# Patient Record
Sex: FEMALE | Employment: FULL TIME | URBAN - METROPOLITAN AREA
[De-identification: names, ages, dates, MRNs, and addresses within clinical notes are randomized per-mention and may not be internally consistent; named-entity substitution may affect disease eponyms.]

---

## 2020-01-20 ENCOUNTER — TELEPHONE (OUTPATIENT)
Dept: ENDOCRINOLOGY | Facility: HOSPITAL | Age: 55
End: 2020-01-20

## 2020-06-01 ENCOUNTER — TELEPHONE (OUTPATIENT)
Dept: ENDOCRINOLOGY | Facility: HOSPITAL | Age: 55
End: 2020-06-01

## 2020-06-03 ENCOUNTER — CONSULT (OUTPATIENT)
Dept: ENDOCRINOLOGY | Facility: HOSPITAL | Age: 55
End: 2020-06-03
Payer: COMMERCIAL

## 2020-06-03 VITALS
HEART RATE: 60 BPM | WEIGHT: 93.6 LBS | TEMPERATURE: 97.7 F | HEIGHT: 64 IN | BODY MASS INDEX: 15.98 KG/M2 | SYSTOLIC BLOOD PRESSURE: 104 MMHG | DIASTOLIC BLOOD PRESSURE: 64 MMHG

## 2020-06-03 DIAGNOSIS — E05.90 HYPERTHYROIDISM: Primary | ICD-10-CM

## 2020-06-03 PROCEDURE — 99244 OFF/OP CNSLTJ NEW/EST MOD 40: CPT | Performed by: INTERNAL MEDICINE

## 2020-06-03 RX ORDER — LANOLIN ALCOHOL/MO/W.PET/CERES
3000 CREAM (GRAM) TOPICAL DAILY
COMMUNITY

## 2020-06-03 RX ORDER — MELATONIN
5000 DAILY
COMMUNITY

## 2020-06-03 RX ORDER — ASCORBIC ACID 1000 MG
TABLET ORAL
COMMUNITY

## 2020-06-13 LAB
T3FREE SERPL-MCNC: 2.4 PG/ML (ref 2–4.4)
T4 FREE SERPL-MCNC: 0.72 NG/DL (ref 0.82–1.77)
THYROGLOB AB SERPL-ACNC: <1 IU/ML (ref 0–0.9)
THYROPEROXIDASE AB SERPL-ACNC: <9 IU/ML (ref 0–34)
TSH SERPL DL<=0.005 MIU/L-ACNC: 2.66 UIU/ML (ref 0.45–4.5)
TSI SER-ACNC: <0.1 IU/L (ref 0–0.55)

## 2020-06-16 DIAGNOSIS — E05.90 HYPERTHYROIDISM: Primary | ICD-10-CM

## 2020-09-23 LAB
T3FREE SERPL-MCNC: 2.6 PG/ML (ref 2–4.4)
T4 FREE SERPL DIALY-MCNC: 0.6 NG/DL
T4 FREE SERPL-MCNC: 0.65 NG/DL (ref 0.82–1.77)
TSH SERPL DL<=0.005 MIU/L-ACNC: 3.15 UIU/ML (ref 0.45–4.5)

## 2020-09-24 DIAGNOSIS — E05.90 HYPERTHYROIDISM: Primary | ICD-10-CM

## 2020-12-05 LAB
T3FREE SERPL-MCNC: 2.4 PG/ML (ref 2–4.4)
T4 FREE SERPL-MCNC: 0.67 NG/DL (ref 0.82–1.77)
TSH SERPL DL<=0.005 MIU/L-ACNC: 3.03 UIU/ML (ref 0.45–4.5)
TSI SER-ACNC: <0.1 IU/L (ref 0–0.55)

## 2020-12-16 ENCOUNTER — OFFICE VISIT (OUTPATIENT)
Dept: ENDOCRINOLOGY | Facility: HOSPITAL | Age: 55
End: 2020-12-16
Payer: COMMERCIAL

## 2020-12-16 VITALS
BODY MASS INDEX: 15.64 KG/M2 | SYSTOLIC BLOOD PRESSURE: 86 MMHG | WEIGHT: 91.6 LBS | HEIGHT: 64 IN | DIASTOLIC BLOOD PRESSURE: 50 MMHG | HEART RATE: 72 BPM

## 2020-12-16 DIAGNOSIS — E05.90 HYPERTHYROIDISM: Primary | ICD-10-CM

## 2020-12-16 PROCEDURE — 99213 OFFICE O/P EST LOW 20 MIN: CPT | Performed by: INTERNAL MEDICINE

## 2020-12-16 RX ORDER — RIBOFLAVIN (VITAMIN B2) 100 MG
100 TABLET ORAL DAILY
COMMUNITY

## 2020-12-28 ENCOUNTER — TELEPHONE (OUTPATIENT)
Dept: ENDOCRINOLOGY | Facility: HOSPITAL | Age: 55
End: 2020-12-28

## 2021-03-25 LAB
T4 FREE SERPL DIALY-MCNC: 0.51 NG/DL
T4 FREE SERPL-MCNC: 0.7 NG/DL (ref 0.82–1.77)
TSH SERPL DL<=0.005 MIU/L-ACNC: 2.45 UIU/ML (ref 0.45–4.5)

## 2021-03-26 DIAGNOSIS — E03.8 CENTRAL HYPOTHYROIDISM: Primary | ICD-10-CM

## 2021-03-29 ENCOUNTER — TELEPHONE (OUTPATIENT)
Dept: ENDOCRINOLOGY | Facility: HOSPITAL | Age: 56
End: 2021-03-29

## 2021-03-29 NOTE — TELEPHONE ENCOUNTER
Patient l/m today stating that she was calling you back  I don't see anything noted for today  I see you spoke to her Friday, but she left the message today

## 2021-07-24 LAB
ALBUMIN SERPL-MCNC: 4.4 G/DL (ref 3.8–4.9)
ALBUMIN/GLOB SERPL: 1.8 {RATIO} (ref 1.2–2.2)
ALP SERPL-CCNC: 88 IU/L (ref 48–121)
ALT SERPL-CCNC: 23 IU/L (ref 0–32)
AST SERPL-CCNC: 30 IU/L (ref 0–40)
BILIRUB SERPL-MCNC: 0.3 MG/DL (ref 0–1.2)
BUN SERPL-MCNC: 13 MG/DL (ref 6–24)
BUN/CREAT SERPL: 21 (ref 9–23)
CALCIUM SERPL-MCNC: 9 MG/DL (ref 8.7–10.2)
CHLORIDE SERPL-SCNC: 99 MMOL/L (ref 96–106)
CO2 SERPL-SCNC: 27 MMOL/L (ref 20–29)
CREAT SERPL-MCNC: 0.63 MG/DL (ref 0.57–1)
FSH SERPL-ACNC: 73.4 MIU/ML
GLOBULIN SER-MCNC: 2.4 G/DL (ref 1.5–4.5)
GLUCOSE SERPL-MCNC: 73 MG/DL (ref 65–99)
IGF-I SERPL-MCNC: 123 NG/ML (ref 60–207)
LH SERPL-ACNC: 25.6 MIU/ML
POTASSIUM SERPL-SCNC: 4.7 MMOL/L (ref 3.5–5.2)
PROLACTIN SERPL-MCNC: 4.9 NG/ML (ref 4.8–23.3)
PROT SERPL-MCNC: 6.8 G/DL (ref 6–8.5)
SL AMB EGFR AFRICAN AMERICAN: 115 ML/MIN/1.73
SL AMB EGFR NON AFRICAN AMERICAN: 100 ML/MIN/1.73
SODIUM SERPL-SCNC: 137 MMOL/L (ref 134–144)
T3FREE SERPL-MCNC: 2.4 PG/ML (ref 2–4.4)
T4 FREE SERPL-MCNC: 0.68 NG/DL (ref 0.82–1.77)
TSH SERPL DL<=0.005 MIU/L-ACNC: 2.21 UIU/ML (ref 0.45–4.5)

## 2021-07-28 LAB
ACTH PLAS-MCNC: 17.5 PG/ML (ref 7.2–63.3)
CORTIS AM PEAK SERPL-MCNC: 11 UG/DL (ref 6.2–19.4)

## 2021-08-19 ENCOUNTER — OFFICE VISIT (OUTPATIENT)
Dept: ENDOCRINOLOGY | Facility: HOSPITAL | Age: 56
End: 2021-08-19
Payer: COMMERCIAL

## 2021-08-19 VITALS
SYSTOLIC BLOOD PRESSURE: 108 MMHG | HEIGHT: 64 IN | DIASTOLIC BLOOD PRESSURE: 70 MMHG | BODY MASS INDEX: 15.54 KG/M2 | HEART RATE: 56 BPM | WEIGHT: 91 LBS

## 2021-08-19 DIAGNOSIS — R94.6 ABNORMAL THYROID FUNCTION TEST: Primary | ICD-10-CM

## 2021-08-19 DIAGNOSIS — E03.8 CENTRAL HYPOTHYROIDISM: ICD-10-CM

## 2021-08-19 PROCEDURE — 99213 OFFICE O/P EST LOW 20 MIN: CPT | Performed by: INTERNAL MEDICINE

## 2021-08-19 NOTE — PROGRESS NOTES
8/19/2021    Assessment/Plan      Diagnoses and all orders for this visit:    Abnormal thyroid function test  -     TSH, 3rd generation; Future  -     T3, free; Future  -     T4, free; Future  -     T4, Free, Direct Dialysis and T4, Total; Future  -     Thyroxine binding globulin; Future  -     Comprehensive metabolic panel Lab Collect; Future  -     T4- Lab Collect; Future    Central hypothyroidism  -     TSH, 3rd generation; Future  -     T3, free; Future  -     T4, free; Future  -     T4, Free, Direct Dialysis and T4, Total; Future  -     Thyroxine binding globulin; Future  -     Comprehensive metabolic panel Lab Collect; Future  -     T4- Lab Collect; Future    Other orders  -     patient supplied medication; Cordyceps        Assessment/Plan:    57-year-old female presents for a follow-up of abnormal thyroid function studies  I suspect this may have been a silent thyroiditis that is slowly recovering  If the free T4 remains low we may have to consider central hypothyroidism as a cause or other interference from other medication or supplement though she does states she hold her biotin prior to getting lab work done  I did ask her to go to a different lab if the lab by insurance to see if lab work will return normal in a different lab and there is lab artifact at [employer]  We will repeat labs in 2-3 months and we will call her with the results  CC: Follow-up    History of Present Illness     HPI: Lia Zimmerman is a 60y o  year old female   Who presents for a follow-up of abnormal thyroid function studies  She does have a family history of thyroid cancer  Initially she was evaluated for biochemical thyrotoxicosis  Blood work showed levels trended the opposite way and free T4 was slightly low  We ended up monitoring over time in blood work as improved  She presents today overall feeling well  No new health issues or symptoms of concern  Review of Systems   Constitutional: Negative for fatigue  HENT: Negative for trouble swallowing and voice change  Eyes: Negative for visual disturbance  Respiratory: Negative for shortness of breath  Cardiovascular: Negative for palpitations and leg swelling  Gastrointestinal: Negative for abdominal pain, nausea and vomiting  Endocrine: Negative for polydipsia and polyuria  Musculoskeletal: Negative for arthralgias and myalgias  Skin: Negative for rash  Neurological: Negative for dizziness, tremors and weakness  Hematological: Negative for adenopathy  Psychiatric/Behavioral: Negative for agitation and confusion  Historical Information   History reviewed  No pertinent past medical history  History reviewed  No pertinent surgical history    Social History   Social History     Substance and Sexual Activity   Alcohol Use None     Social History     Substance and Sexual Activity   Drug Use Not on file     Social History     Tobacco Use   Smoking Status Never Smoker   Smokeless Tobacco Never Used     Family History:   Family History   Problem Relation Age of Onset    Breast cancer Mother     Thyroid cancer Mother     Lung cancer Mother     Tuberculosis Mother     Arthritis Mother     Anemia Mother     Emphysema Mother     Alcohol abuse Father     Diabetes unspecified Father     Rheum arthritis Brother        Meds/Allergies   Current Outpatient Medications   Medication Sig Dispense Refill    Ascorbic Acid (vitamin C) 100 MG tablet Take 100 mg by mouth daily      Biotin 100 MG/GM POWD Take by mouth      CALCIUM ACETATE-MAGNESIUM CARB PO Take by mouth      CHERRY CONCENTRATE PO Take by mouth      cholecalciferol (VITAMIN D3) 1,000 units tablet Take 5,000 Units by mouth daily      Ferrous Gluconate (IRON 27 PO) Take by mouth      Folic Acid (FOLATE PO) Take by mouth      Ginkgo Biloba 40 MG TABS Take by mouth      Magnesium 400 MG CAPS Take by mouth      NALTREXONE HCL PO Take 3 mg by mouth      Nutritional Supplements (DHEA PO) Take by mouth      patient supplied medication Cordyceps      Probiotic Product (PROBIOTIC ADVANCED PO) Take by mouth      STRONTIUM-89 CHLORIDE IV Oral supplement      vitamin B-12 (VITAMIN B-12) 1,000 mcg tablet Take 3,000 mcg by mouth daily      Zinc Sulfate (ZINC 15 PO) Take by mouth       No current facility-administered medications for this visit  Allergies   Allergen Reactions    Prednisone GI Intolerance and Hyperactivity    Sulfa Antibiotics Rash and Fever       Objective   Vitals: Blood pressure 108/70, pulse 56, height 5' 4" (1 626 m), weight 41 3 kg (91 lb)  Invasive Devices     None                 Physical Exam  Constitutional:       General: She is not in acute distress  Appearance: She is well-developed  She is not diaphoretic  HENT:      Head: Normocephalic and atraumatic  Eyes:      Conjunctiva/sclera: Conjunctivae normal       Pupils: Pupils are equal, round, and reactive to light  Neck:      Thyroid: No thyromegaly  Cardiovascular:      Rate and Rhythm: Normal rate and regular rhythm  Pulmonary:      Effort: Pulmonary effort is normal  No respiratory distress  Breath sounds: Normal breath sounds  Abdominal:      General: Bowel sounds are normal       Palpations: Abdomen is soft  Musculoskeletal:         General: Normal range of motion  Cervical back: Normal range of motion and neck supple  Skin:     General: Skin is warm and dry  Findings: No rash  Neurological:      Mental Status: She is alert and oriented to person, place, and time  Motor: No abnormal muscle tone  Psychiatric:         Behavior: Behavior normal          The history was obtained from the review of the chart and from the patient      Lab Results:      Recent Results (from the past 11387 hour(s))   Free T4 by Dialysis/Mass Spec    Collection Time: 09/16/20 11:08 AM   Result Value Ref Range    Free T4 by Dialysis/Mass Spec 0 60 (L) ng/dL   T4, free    Collection Time: 09/16/20 11:08 AM   Result Value Ref Range    Free t4 0 65 (L) 0 82 - 1 77 ng/dL   TSH, 3rd generation    Collection Time: 09/16/20 11:08 AM   Result Value Ref Range    TSH 3 150 0 450 - 4 500 uIU/mL   T3, free    Collection Time: 09/16/20 11:08 AM   Result Value Ref Range    Free T3 2 6 2 0 - 4 4 pg/mL   T4, free    Collection Time: 12/02/20 12:19 PM   Result Value Ref Range    Free t4 0 67 (L) 0 82 - 1 77 ng/dL   TSH, 3rd generation    Collection Time: 12/02/20 12:19 PM   Result Value Ref Range    TSH 3 030 0 450 - 4 500 uIU/mL   Thyroid stimulating immunoglobulin    Collection Time: 12/02/20 12:19 PM   Result Value Ref Range    TSI <0 10 0 00 - 0 55 IU/L   T3, free    Collection Time: 12/02/20 12:19 PM   Result Value Ref Range    Free T3 2 4 2 0 - 4 4 pg/mL   TSH WITH REFLEX TO FREE T4    Collection Time: 03/19/21  1:52 PM   Result Value Ref Range    TSH 2 450 0 450 - 4 500 uIU/mL    Free t4 0 70 (L) 0 82 - 1 77 ng/dL   Free T4 by Dialysis/Mass Spec    Collection Time: 03/19/21  1:52 PM   Result Value Ref Range    Free T4 by Dialysis/Mass Spec 0 51 (L) ng/dL   Comprehensive metabolic panel    Collection Time: 07/23/21 10:32 AM   Result Value Ref Range    Glucose, Random 73 65 - 99 mg/dL    BUN 13 6 - 24 mg/dL    Creatinine 0 63 0 57 - 1 00 mg/dL    eGFR Non African American 100 >59 mL/min/1 73    eGFR  115 >59 mL/min/1 73    SL AMB BUN/CREATININE RATIO 21 9 - 23    Sodium 137 134 - 144 mmol/L    Potassium 4 7 3 5 - 5 2 mmol/L    Chloride 99 96 - 106 mmol/L    CO2 27 20 - 29 mmol/L    CALCIUM 9 0 8 7 - 10 2 mg/dL    Protein, Total 6 8 6 0 - 8 5 g/dL    Albumin 4 4 3 8 - 4 9 g/dL    Globulin, Total 2 4 1 5 - 4 5 g/dL    Albumin/Globulin Ratio 1 8 1 2 - 2 2    TOTAL BILIRUBIN 0 3 0 0 - 1 2 mg/dL    Alk Phos Isoenzymes 88 48 - 121 IU/L    AST 30 0 - 40 IU/L    ALT 23 0 - 32 IU/L   FSH and LH    Collection Time: 07/23/21 10:32 AM   Result Value Ref Range    Luteinizing Hormone (LH) 25 6 mIU/mL    FSH 73 4 mIU/mL   T4, free    Collection Time: 07/23/21 10:32 AM   Result Value Ref Range    Free t4 0 68 (L) 0 82 - 1 77 ng/dL   TSH, 3rd generation    Collection Time: 07/23/21 10:32 AM   Result Value Ref Range    TSH 2 210 0 450 - 4 500 uIU/mL   Prolactin    Collection Time: 07/23/21 10:32 AM   Result Value Ref Range    Prolactin 4 9 4 8 - 23 3 ng/mL   Insulin-like growth factor 1 (IGF-1)    Collection Time: 07/23/21 10:32 AM   Result Value Ref Range    Insulin-Like GF-1 123 60 - 207 ng/mL   T3, free    Collection Time: 07/23/21 10:32 AM   Result Value Ref Range    Free T3 2 4 2 0 - 4 4 pg/mL   ACTH    Collection Time: 07/27/21  8:52 AM   Result Value Ref Range    ACTH 17 5 7 2 - 63 3 pg/mL   Cortisol Level, AM Specimen    Collection Time: 07/27/21  8:52 AM   Result Value Ref Range    Cortisol AM 11 0 6 2 - 19 4 ug/dL         No future appointments  Portions of the record may have been created with voice recognition software  Occasional wrong word or "sound a like" substitutions may have occurred due to the inherent limitations of voice recognition software  Read the chart carefully and recognize, using context, where substitutions have occurred

## 2021-10-26 ENCOUNTER — TELEPHONE (OUTPATIENT)
Dept: ENDOCRINOLOGY | Facility: HOSPITAL | Age: 56
End: 2021-10-26

## 2021-10-26 DIAGNOSIS — E03.9 HYPOTHYROIDISM, UNSPECIFIED TYPE: Primary | ICD-10-CM

## 2021-11-02 RX ORDER — LEVOTHYROXINE SODIUM 25 UG/1
CAPSULE ORAL
Qty: 30 CAPSULE | Refills: 5 | Status: SHIPPED | OUTPATIENT
Start: 2021-11-02 | End: 2021-11-09

## 2021-12-02 LAB
T3FREE SERPL-MCNC: 2.6 PG/ML (ref 2–4.4)
T4 FREE SERPL-MCNC: 0.69 NG/DL (ref 0.82–1.77)
T4 SERPL-MCNC: 4.7 UG/DL (ref 4.5–12)
TSH SERPL DL<=0.005 MIU/L-ACNC: 2.28 UIU/ML (ref 0.45–4.5)

## 2021-12-17 DIAGNOSIS — E03.9 HYPOTHYROIDISM, UNSPECIFIED TYPE: ICD-10-CM

## 2021-12-17 RX ORDER — LEVOTHYROXINE SODIUM 25 UG/1
CAPSULE ORAL
Qty: 90 CAPSULE | Refills: 0 | Status: SHIPPED | OUTPATIENT
Start: 2021-12-17 | End: 2022-01-14 | Stop reason: SDUPTHER

## 2022-01-13 LAB
T4 FREE SERPL-MCNC: 0.74 NG/DL (ref 0.82–1.77)
TSH SERPL DL<=0.005 MIU/L-ACNC: 1.66 UIU/ML (ref 0.45–4.5)

## 2022-02-18 ENCOUNTER — TELEPHONE (OUTPATIENT)
Dept: ENDOCRINOLOGY | Facility: HOSPITAL | Age: 57
End: 2022-02-18

## 2022-02-18 NOTE — TELEPHONE ENCOUNTER
Called pt to schedule 2/23 appt  She only wants to see TG  She cannot do mornings or 2/21  I told her I will cb to reschedule when his schedule opens up for March  Pt had labs done 2/17 at [facility]  She would like them reviewed when we get them

## 2022-02-21 ENCOUNTER — TELEPHONE (OUTPATIENT)
Dept: ENDOCRINOLOGY | Facility: HOSPITAL | Age: 57
End: 2022-02-21

## 2022-02-21 NOTE — TELEPHONE ENCOUNTER
We rescheduled 2/23 to 3/8  Pt had labs done 2/17 at [facility]  She would like results before appt  I said I will request results from [facility] and have Dr Shirley Boss review

## 2022-02-28 DIAGNOSIS — E03.9 HYPOTHYROIDISM, UNSPECIFIED TYPE: ICD-10-CM

## 2022-02-28 LAB
T3FREE SERPL-MCNC: 2.1 PG/ML (ref 2–4.4)
T4 FREE SERPL DIALY-MCNC: 0.84 NG/DL
T4 FREE SERPL-MCNC: 0.87 NG/DL (ref 0.82–1.77)
TSH SERPL DL<=0.005 MIU/L-ACNC: 1.32 UIU/ML (ref 0.45–4.5)

## 2022-02-28 RX ORDER — LEVOTHYROXINE SODIUM 25 UG/1
CAPSULE ORAL
Qty: 102 CAPSULE | Refills: 0 | Status: SHIPPED | OUTPATIENT
Start: 2022-02-28 | End: 2022-03-08 | Stop reason: SDUPTHER

## 2022-03-08 ENCOUNTER — OFFICE VISIT (OUTPATIENT)
Dept: ENDOCRINOLOGY | Facility: HOSPITAL | Age: 57
End: 2022-03-08
Payer: COMMERCIAL

## 2022-03-08 VITALS
WEIGHT: 90.6 LBS | BODY MASS INDEX: 15.47 KG/M2 | SYSTOLIC BLOOD PRESSURE: 108 MMHG | HEIGHT: 64 IN | HEART RATE: 56 BPM | DIASTOLIC BLOOD PRESSURE: 60 MMHG

## 2022-03-08 DIAGNOSIS — E03.9 HYPOTHYROIDISM, UNSPECIFIED TYPE: ICD-10-CM

## 2022-03-08 DIAGNOSIS — E04.2 MULTIPLE THYROID NODULES: ICD-10-CM

## 2022-03-08 DIAGNOSIS — E03.8 SUBCLINICAL HYPOTHYROIDISM: Primary | ICD-10-CM

## 2022-03-08 PROCEDURE — 99214 OFFICE O/P EST MOD 30 MIN: CPT | Performed by: INTERNAL MEDICINE

## 2022-03-08 RX ORDER — LEVOTHYROXINE SODIUM 25 UG/1
CAPSULE ORAL
Qty: 90 CAPSULE | Refills: 0
Start: 2022-03-08 | End: 2022-04-04 | Stop reason: SDUPTHER

## 2022-03-08 NOTE — PROGRESS NOTES
3/8/2022    Assessment/Plan      Diagnoses and all orders for this visit:    Subclinical hypothyroidism    Hypothyroidism, unspecified type  -     Tirosint 25 MCG CAPS; 1 cap 6 days of the week, 2 capsules sundays  TIROSINT BRAND  -     TSH, 3rd generation; Future  -     T3, free; Future  -     T4, free; Future    Multiple thyroid nodules  -     US thyroid; Future        Assessment/Plan:  Hypothyroidism:  Clinically and biochemically doing well on current regimen  I suspect this is on the basis of silent thyroiditis and hypothyroid phase so we will monitor her lab work over time and see we can titrate her regimen as able  In the past she did have a TSH that was normal with low free T4 and has had head imaging that did not show any sellar abnormalities  For now continue current regimen and monitor labs over time  Will update a thyroid ultrasound as well  CC: Follow-up    History of Present Illness     HPI: Edvin Maharaj is a 60y o  year old female who presents for a follow-up appointment  Initially she was seen for abnormal thyroid function studies  Initially she had biochemical thyrotoxicosis  Blood work then trended the opposite way and free T4 was slightly low  This was monitored over time  Eventually it was decided to trial treatment of Tirosint for what I believe it is a hypothyroid phase of silent thyroiditis  She is currently on 25 mcg 6 days a week and 50 mcg on Sundays  She does have a family history of thyroid cancer  She had an ultrasound which showed subcentimeter colloid cysts  Presents today overall feeling well  No neck compressive symptoms  Review of Systems   Constitutional: Negative for fatigue  HENT: Negative for trouble swallowing and voice change  Eyes: Negative for visual disturbance  Respiratory: Negative for shortness of breath  Cardiovascular: Negative for palpitations and leg swelling  Gastrointestinal: Negative for abdominal pain, nausea and vomiting  Endocrine: Negative for polydipsia and polyuria  Musculoskeletal: Negative for arthralgias and myalgias  Skin: Negative for rash  Neurological: Negative for dizziness, tremors and weakness  Hematological: Negative for adenopathy  Psychiatric/Behavioral: Negative for agitation and confusion  Historical Information   History reviewed  No pertinent past medical history  History reviewed  No pertinent surgical history  Social History   Social History     Substance and Sexual Activity   Alcohol Use None     Social History     Substance and Sexual Activity   Drug Use Not on file     Social History     Tobacco Use   Smoking Status Never Smoker   Smokeless Tobacco Never Used     Family History:   Family History   Problem Relation Age of Onset    Breast cancer Mother     Thyroid cancer Mother     Lung cancer Mother     Tuberculosis Mother     Arthritis Mother     Anemia Mother     Emphysema Mother     Alcohol abuse Father     Diabetes unspecified Father     Rheum arthritis Brother        Meds/Allergies   Current Outpatient Medications   Medication Sig Dispense Refill    Ascorbic Acid (vitamin C) 100 MG tablet Take 100 mg by mouth daily      Biotin 100 MG/GM POWD Take by mouth      CALCIUM ACETATE-MAGNESIUM CARB PO Take by mouth      CHERRY CONCENTRATE PO Take by mouth      cholecalciferol (VITAMIN D3) 1,000 units tablet Take 5,000 Units by mouth daily      Ferrous Gluconate (IRON 27 PO) Take by mouth      Folic Acid (FOLATE PO) Take by mouth      Ginkgo Biloba 40 MG TABS Take by mouth      Magnesium 400 MG CAPS Take by mouth      NALTREXONE HCL PO Take 3 mg by mouth      Nutritional Supplements (DHEA PO) Take by mouth      patient supplied medication Cordyceps      Probiotic Product (PROBIOTIC ADVANCED PO) Take by mouth      STRONTIUM-89 CHLORIDE IV Oral supplement      Tirosint 25 MCG CAPS 1 cap 6 days of the week, 2 capsules sundays   TIROSINT BRAND 90 capsule 0    vitamin B-12 (VITAMIN B-12) 1,000 mcg tablet Take 3,000 mcg by mouth daily      Zinc Sulfate (ZINC 15 PO) Take by mouth       No current facility-administered medications for this visit  Allergies   Allergen Reactions    Prednisone GI Intolerance and Hyperactivity    Sulfa Antibiotics Rash and Fever       Objective   Vitals: Blood pressure 108/60, pulse 56, height 5' 4" (1 626 m), weight 41 1 kg (90 lb 9 6 oz)  Invasive Devices  Report    None                 Physical Exam  Vitals reviewed  Constitutional:       General: She is not in acute distress  Appearance: She is well-developed  She is not diaphoretic  HENT:      Head: Normocephalic and atraumatic  Eyes:      Conjunctiva/sclera: Conjunctivae normal       Pupils: Pupils are equal, round, and reactive to light  Neck:      Thyroid: No thyromegaly  Cardiovascular:      Rate and Rhythm: Normal rate and regular rhythm  Pulmonary:      Effort: Pulmonary effort is normal  No respiratory distress  Breath sounds: Normal breath sounds  Abdominal:      General: Bowel sounds are normal       Palpations: Abdomen is soft  Musculoskeletal:         General: Normal range of motion  Cervical back: Normal range of motion and neck supple  Skin:     General: Skin is warm and dry  Findings: No rash  Neurological:      Mental Status: She is alert and oriented to person, place, and time  Motor: No abnormal muscle tone  Psychiatric:         Behavior: Behavior normal          The history was obtained from the review of the chart and from the patient      Lab Results:      Recent Results (from the past 65174 hour(s))   TSH WITH REFLEX TO FREE T4    Collection Time: 03/19/21  1:52 PM   Result Value Ref Range    TSH 2 450 0 450 - 4 500 uIU/mL    Free t4 0 70 (L) 0 82 - 1 77 ng/dL   Free T4 by Dialysis/Mass Spec    Collection Time: 03/19/21  1:52 PM   Result Value Ref Range    Free T4 by Dialysis/Mass Spec 0 51 (L) ng/dL   Comprehensive metabolic panel    Collection Time: 07/23/21 10:32 AM   Result Value Ref Range    Glucose, Random 73 65 - 99 mg/dL    BUN 13 6 - 24 mg/dL    Creatinine 0 63 0 57 - 1 00 mg/dL    eGFR Non African American 100 >59 mL/min/1 73    eGFR  115 >59 mL/min/1 73    SL AMB BUN/CREATININE RATIO 21 9 - 23    Sodium 137 134 - 144 mmol/L    Potassium 4 7 3 5 - 5 2 mmol/L    Chloride 99 96 - 106 mmol/L    CO2 27 20 - 29 mmol/L    CALCIUM 9 0 8 7 - 10 2 mg/dL    Protein, Total 6 8 6 0 - 8 5 g/dL    Albumin 4 4 3 8 - 4 9 g/dL    Globulin, Total 2 4 1 5 - 4 5 g/dL    Albumin/Globulin Ratio 1 8 1 2 - 2 2    TOTAL BILIRUBIN 0 3 0 0 - 1 2 mg/dL    Alk Phos Isoenzymes 88 48 - 121 IU/L    AST 30 0 - 40 IU/L    ALT 23 0 - 32 IU/L   FSH and LH    Collection Time: 07/23/21 10:32 AM   Result Value Ref Range    Luteinizing Hormone (LH) 25 6 mIU/mL    FSH 73 4 mIU/mL   T4, free    Collection Time: 07/23/21 10:32 AM   Result Value Ref Range    Free t4 0 68 (L) 0 82 - 1 77 ng/dL   TSH, 3rd generation    Collection Time: 07/23/21 10:32 AM   Result Value Ref Range    TSH 2 210 0 450 - 4 500 uIU/mL   Prolactin    Collection Time: 07/23/21 10:32 AM   Result Value Ref Range    Prolactin 4 9 4 8 - 23 3 ng/mL   Insulin-like growth factor 1 (IGF-1)    Collection Time: 07/23/21 10:32 AM   Result Value Ref Range    Insulin-Like GF-1 123 60 - 207 ng/mL   T3, free    Collection Time: 07/23/21 10:32 AM   Result Value Ref Range    Free T3 2 4 2 0 - 4 4 pg/mL   ACTH    Collection Time: 07/27/21  8:52 AM   Result Value Ref Range    ACTH 17 5 7 2 - 63 3 pg/mL   Cortisol Level, AM Specimen    Collection Time: 07/27/21  8:52 AM   Result Value Ref Range    Cortisol AM 11 0 6 2 - 19 4 ug/dL   T4, free    Collection Time: 12/01/21 11:04 AM   Result Value Ref Range    Free t4 0 69 (L) 0 82 - 1 77 ng/dL   TSH, 3rd generation    Collection Time: 12/01/21 11:04 AM   Result Value Ref Range    TSH 2 280 0 450 - 4 500 uIU/mL   T4    Collection Time: 12/01/21 11:04 AM Result Value Ref Range    T4, Total 4 7 4 5 - 12 0 ug/dL   T3, free    Collection Time: 12/01/21 11:04 AM   Result Value Ref Range    Free T3 2 6 2 0 - 4 4 pg/mL   T4, free    Collection Time: 01/12/22 10:17 AM   Result Value Ref Range    Free t4 0 74 (L) 0 82 - 1 77 ng/dL   TSH, 3rd generation    Collection Time: 01/12/22 10:17 AM   Result Value Ref Range    TSH 1 660 0 450 - 4 500 uIU/mL   Free T4 by Dialysis/Mass Spec    Collection Time: 02/17/22 10:13 AM   Result Value Ref Range    Free T4 by Dialysis/Mass Spec 0 84 ng/dL   T4, free    Collection Time: 02/17/22 10:13 AM   Result Value Ref Range    Free t4 0 87 0 82 - 1 77 ng/dL   TSH, 3rd generation    Collection Time: 02/17/22 10:13 AM   Result Value Ref Range    TSH 1 320 0 450 - 4 500 uIU/mL   T3, free    Collection Time: 02/17/22 10:13 AM   Result Value Ref Range    Free T3 2 1 2 0 - 4 4 pg/mL         No future appointments  Portions of the record may have been created with voice recognition software  Occasional wrong word or "sound a like" substitutions may have occurred due to the inherent limitations of voice recognition software  Read the chart carefully and recognize, using context, where substitutions have occurred

## 2022-04-04 DIAGNOSIS — E03.9 HYPOTHYROIDISM, UNSPECIFIED TYPE: ICD-10-CM

## 2022-04-04 NOTE — TELEPHONE ENCOUNTER
Received call from patient  She would like to know if there is a preference from you of who to use for her Tirosint  Options are [facility] [place]  Please advise

## 2022-04-05 RX ORDER — LEVOTHYROXINE SODIUM 25 UG/1
CAPSULE ORAL
Qty: 102 CAPSULE | Refills: 1 | Status: SHIPPED | OUTPATIENT
Start: 2022-04-05 | End: 2022-07-11

## 2022-04-06 ENCOUNTER — TELEPHONE (OUTPATIENT)
Dept: ENDOCRINOLOGY | Facility: HOSPITAL | Age: 57
End: 2022-04-06

## 2022-04-06 NOTE — TELEPHONE ENCOUNTER
Patient had her U/S done on 3/30 at [facility]  The results are there  She would like you to review them

## 2022-04-06 NOTE — TELEPHONE ENCOUNTER
Reviewed ultrasound report which showed 2 small thyroid nodules  Neither of them require biopsy and can be monitored over time with ultrasound surveillance again in about 1 year

## 2022-07-11 DIAGNOSIS — E03.9 HYPOTHYROIDISM, UNSPECIFIED TYPE: ICD-10-CM

## 2022-07-11 RX ORDER — LEVOTHYROXINE SODIUM 25 UG/1
CAPSULE ORAL
Qty: 102 CAPSULE | Refills: 1 | Status: SHIPPED | OUTPATIENT
Start: 2022-07-11

## 2022-07-19 ENCOUNTER — TELEPHONE (OUTPATIENT)
Dept: ENDOCRINOLOGY | Facility: CLINIC | Age: 57
End: 2022-07-19

## 2024-05-24 ENCOUNTER — HOSPITAL ENCOUNTER (OUTPATIENT)
Dept: HOSPITAL 99 - WDC | Age: 59
End: 2024-05-24
Payer: COMMERCIAL

## 2024-05-24 DIAGNOSIS — Z12.31: Primary | ICD-10-CM

## 2024-07-02 ENCOUNTER — HOSPITAL ENCOUNTER (OUTPATIENT)
Dept: HOSPITAL 99 - WOUND | Age: 59
End: 2024-07-02
Payer: COMMERCIAL

## 2024-07-02 DIAGNOSIS — I73.00: ICD-10-CM

## 2024-07-02 DIAGNOSIS — S61.200A: Primary | ICD-10-CM
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
## 2024-07-02 DIAGNOSIS — M34.9: ICD-10-CM

## 2024-07-16 ENCOUNTER — HOSPITAL ENCOUNTER (OUTPATIENT)
Dept: HOSPITAL 99 - WOUND | Age: 59
End: 2024-07-16
Payer: COMMERCIAL

## 2024-07-16 DIAGNOSIS — M34.9: ICD-10-CM

## 2024-07-16 DIAGNOSIS — I73.00: ICD-10-CM
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
## 2024-07-16 DIAGNOSIS — S61.200A: Primary | ICD-10-CM

## 2024-08-13 ENCOUNTER — HOSPITAL ENCOUNTER (OUTPATIENT)
Dept: HOSPITAL 99 - WOUND | Age: 59
End: 2024-08-13
Payer: COMMERCIAL

## 2024-08-13 DIAGNOSIS — S61.200A: Primary | ICD-10-CM

## 2024-08-13 DIAGNOSIS — M34.9: ICD-10-CM
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
## 2024-08-13 DIAGNOSIS — I73.00: ICD-10-CM

## 2025-05-30 ENCOUNTER — HOSPITAL ENCOUNTER (OUTPATIENT)
Dept: HOSPITAL 99 - WDC | Age: 60
End: 2025-05-30
Payer: COMMERCIAL

## 2025-05-30 DIAGNOSIS — Z12.31: Primary | ICD-10-CM
